# Patient Record
Sex: MALE | Race: WHITE | Employment: PART TIME | URBAN - METROPOLITAN AREA
[De-identification: names, ages, dates, MRNs, and addresses within clinical notes are randomized per-mention and may not be internally consistent; named-entity substitution may affect disease eponyms.]

---

## 2019-02-26 ENCOUNTER — HOSPITAL ENCOUNTER (EMERGENCY)
Age: 59
Discharge: HOME | End: 2019-02-26
Attending: EMERGENCY MEDICINE
Payer: SELF-PAY

## 2019-02-26 VITALS
WEIGHT: 182 LBS | HEIGHT: 72 IN | SYSTOLIC BLOOD PRESSURE: 127 MMHG | OXYGEN SATURATION: 97 % | RESPIRATION RATE: 17 BRPM | DIASTOLIC BLOOD PRESSURE: 91 MMHG | BODY MASS INDEX: 24.65 KG/M2 | TEMPERATURE: 97 F | HEART RATE: 86 BPM

## 2019-02-26 DIAGNOSIS — M25.9 MULTIPLE JOINT COMPLAINTS: ICD-10-CM

## 2019-02-26 DIAGNOSIS — M25.512 PAIN IN JOINT OF LEFT SHOULDER: Primary | ICD-10-CM

## 2019-02-26 PROCEDURE — 74011000250 HC RX REV CODE- 250: Performed by: EMERGENCY MEDICINE

## 2019-02-26 PROCEDURE — 99284 EMERGENCY DEPT VISIT MOD MDM: CPT

## 2019-02-26 PROCEDURE — 74011250637 HC RX REV CODE- 250/637: Performed by: EMERGENCY MEDICINE

## 2019-02-26 RX ORDER — IBUPROFEN 600 MG/1
600 TABLET ORAL
Status: COMPLETED | OUTPATIENT
Start: 2019-02-26 | End: 2019-02-26

## 2019-02-26 RX ORDER — ACETAMINOPHEN 325 MG/1
650 TABLET ORAL
Qty: 20 TAB | Refills: 0 | Status: SHIPPED | OUTPATIENT
Start: 2019-02-26 | End: 2021-12-13

## 2019-02-26 RX ORDER — METHYLPREDNISOLONE 4 MG/1
TABLET ORAL
Qty: 1 DOSE PACK | Refills: 0 | Status: SHIPPED | OUTPATIENT
Start: 2019-02-26 | End: 2021-12-13

## 2019-02-26 RX ORDER — ACETAMINOPHEN 500 MG
1000 TABLET ORAL
Status: COMPLETED | OUTPATIENT
Start: 2019-02-26 | End: 2019-02-26

## 2019-02-26 RX ORDER — IBUPROFEN 600 MG/1
TABLET ORAL
COMMUNITY
End: 2019-02-26

## 2019-02-26 RX ORDER — LIDOCAINE 4 G/100G
1 PATCH TOPICAL EVERY 24 HOURS
Status: DISCONTINUED | OUTPATIENT
Start: 2019-02-26 | End: 2019-02-26 | Stop reason: HOSPADM

## 2019-02-26 RX ORDER — IBUPROFEN 600 MG/1
600 TABLET ORAL
Qty: 20 TAB | Refills: 0 | Status: SHIPPED | OUTPATIENT
Start: 2019-02-26 | End: 2021-12-13

## 2019-02-26 RX ADMIN — IBUPROFEN 600 MG: 600 TABLET, FILM COATED ORAL at 09:07

## 2019-02-26 RX ADMIN — ACETAMINOPHEN 1000 MG: 500 TABLET, FILM COATED ORAL at 09:07

## 2019-02-26 NOTE — LETTER
NOTIFICATION RETURN TO WORK / SCHOOL 
 
2/26/2019 8:56 AM 
 
Mr. Frances Pelayo [address] [place] [address] To Whom It May Concern: 
 
Frances Pelayo is currently under the care of [facility] EMERGENCY DEPT. He will return to work/school on: 2/28/2019 If there are questions or concerns please have the patient contact our office. Sincerely, Jarred Kearney MD

## 2019-02-26 NOTE — DISCHARGE INSTRUCTIONS

## 2019-02-26 NOTE — ED PROVIDER NOTES
EMERGENCY DEPARTMENT HISTORY AND PHYSICAL EXAM 
 
8:33 AM 
 
 
Date: 2/26/2019 Patient Name: Halie Oviedo History of Presenting Illness Chief Complaint Patient presents with  Joint Pain History Provided By: Patient Additional History (Context): Halie Oviedo is a 63 y.o. male with osteoarthritis who presents with acute on chronic joint pain. Pt states this pain has been an issue for 7-8 yrs, and he was last seen by an orthopedic specialist 3 yrs ago due to not currently having insurance. Pt c/o diffuse joint pain to shoulders, knee, feet, with L shoulder being the most painful. He denies fever, N/V, melena or hematochezia, any acute sx. Pt has a PSHx fusions. Pt uses tobacco; he is a , which he states is contributing to his pain. PCP: None Chief Complaint: Arthralgias Duration:  days, chronic for years Timing:  worsening, acute on chronic Location: Diffuse Quality: not reported Severity: 8 out of 10 Modifying Factors: worsened by his occupation Associated Symptoms: denies any other associated signs or symptoms Current Facility-Administered Medications Medication Dose Route Frequency Provider Last Rate Last Dose  acetaminophen (TYLENOL) tablet 1,000 mg  1,000 mg Oral NOW Cailin Maldonado MD      
 ibuprofen (MOTRIN) tablet 600 mg  600 mg Oral NOW Cailin Maldonado MD      
 lidocaine 4 % patch 1 Patch  1 Patch TransDERmal Q24H Cailin Maldonado MD      
 
Current Outpatient Medications Medication Sig Dispense Refill  methylPREDNISolone (MEDROL, CHARLES,) 4 mg tablet Use as prescribed 1 Dose Pack 0  ibuprofen (MOTRIN) 600 mg tablet Take 1 Tab by mouth every six (6) hours as needed for Pain. 20 Tab 0  
 acetaminophen (TYLENOL) 325 mg tablet Take 2 Tabs by mouth every four (4) hours as needed for Pain. 20 Tab 0 Past History Past Medical History: 
Past Medical History:  
Diagnosis Date  Arthritis Past Surgical History: Past Surgical History:  
Procedure Laterality Date  HX UROLOGICAL  2008 Adrenalectomy Family History: 
History reviewed. No pertinent family history. Social History: 
Social History Tobacco Use  Smoking status: Current Every Day Smoker Packs/day: 0.25  Smokeless tobacco: Never Used Substance Use Topics  Alcohol use: Yes  Drug use: No  
 
 
Allergies: 
No Known Allergies Review of Systems Review of Systems Constitutional: Negative for activity change, fatigue and fever. HENT: Negative for congestion and rhinorrhea. Eyes: Negative for visual disturbance. Respiratory: Negative for shortness of breath. Cardiovascular: Negative for chest pain and palpitations. Gastrointestinal: Negative for abdominal pain, diarrhea, nausea and vomiting. Genitourinary: Negative for dysuria and hematuria. Musculoskeletal: Positive for arthralgias. Negative for back pain. Skin: Negative for rash. Neurological: Negative for dizziness, weakness and light-headedness. Psychiatric/Behavioral: Negative for agitation. All other systems reviewed and are negative. Physical Exam  
 
Visit Vitals BP (!) 157/100 (BP 1 Location: Right arm, BP Patient Position: At rest) Pulse 86 Temp 97 °F (36.1 °C) Resp 17 Ht 6' (1.829 m) Wt 82.6 kg (182 lb) SpO2 99% BMI 24.68 kg/m² Physical Exam  
Constitutional: He appears well-developed and well-nourished. HENT:  
Head: Normocephalic and atraumatic. Eyes: Conjunctivae are normal. Pupils are equal, round, and reactive to light. Neck: Normal range of motion. Neck supple. Cardiovascular: Normal rate and regular rhythm. Pulses: 
     Dorsalis pedis pulses are 2+ on the right side, and 2+ on the left side. Pulmonary/Chest: Effort normal and breath sounds normal.  
Abdominal: Soft. Bowel sounds are normal.  
Musculoskeletal: Normal range of motion. L wrist volar lateral small ganglion cyst appreciated L AC joint protuberant Lymphadenopathy:  
  He has no cervical adenopathy. Neurological: He is alert. Skin: Skin is warm. Psychiatric: He has a normal mood and affect. Diagnostic Study Results Labs - No results found for this or any previous visit (from the past 12 hour(s)). Radiologic Studies - No orders to display Medical Decision Making It should be noted that Agueda Walters MD will be the provider of record for this patient. I reviewed the vital signs, available nursing notes, past medical history, past surgical history, family history and social history. Vital Signs-Reviewed the patient's vital signs. Pulse Oximetry Analysis -  100% on room air (Interpretation) wnl 
 
Cardiac Monitor: 
Rate: 86 Records Reviewed: Nursing Notes (Time of Review: 8:33 AM) ED Course: Progress Notes, Reevaluation, and Consults: 
 
 
Provider Notes (Medical Decision Making): Patient presents to emergency department with multiple joint pain including left shoulder and left wrist and right wrists. Patient states all of his joints have been hurting for 8 years have been progressively worse he has never seen a rheumatologist.  He does state that the pain is worse in the morning and worse when he wakes up in the stiffness is there progressively improved. Patient also states that he has no focal neurological deficits no fever chills no vomiting no diarrhea no chest pain no pallor and no bowel or bladder dysfunction. Patient is neurologically intact Patient will also get a follow-up rheumatologist and primary care physician. I will start patient on tapering dose of steroids. Patient is in construction and needs some time off as well. Patient does not any significant physical findings. Diagnosis Clinical Impression: 1. Pain in joint of left shoulder 2. Multiple joint complaints Disposition: Discharge Follow-up Information Follow up With Specialties Details Why Contact [facility] EMERGENCY DEPT Emergency Medicine  If symptoms worsen [address] 
[phone] [facility] Advanced Rheumatology  Call today Follow Up From Emergency Department [address] [address] ([facility]) [address] [phone] [address]   [address] [address]) [address] [phone] Medication List  
  
START taking these medications   
acetaminophen 325 mg tablet Commonly known as:  TYLENOL Take 2 Tabs by mouth every four (4) hours as needed for Pain. methylPREDNISolone 4 mg tablet Commonly known as:  MEDROL (CHARLES) Use as prescribed CHANGE how you take these medications   
ibuprofen 600 mg tablet Commonly known as:  MOTRIN Take 1 Tab by mouth every six (6) hours as needed for Pain. What changed:  how much to take Where to Get Your Medications Information about where to get these medications is not yet available Ask your nurse or doctor about these medications · acetaminophen 325 mg tablet · ibuprofen 600 mg tablet · methylPREDNISolone 4 mg tablet 
  
 
_______________________________ Scribe Attestation [facility] acting as a scribe for and in the presence of Jennifer Ornelas MD     
February 26, 2019 at 8:33 AM 
    
Provider Attestation:     
I personally performed the services described in the documentation, reviewed the documentation, as recorded by the scribe in my presence, and it accurately and completely records my words and actions. February 26, 2019 at 8:33 AM - Jennifer Ornelas MD   
 
 
_______________________________

## 2021-01-27 ENCOUNTER — HOSPITAL ENCOUNTER (OUTPATIENT)
Dept: PREADMISSION TESTING | Age: 61
Discharge: HOME | End: 2021-01-27
Payer: MEDICAID

## 2021-01-27 ENCOUNTER — TRANSCRIBE ORDER (OUTPATIENT)
Dept: REGISTRATION | Age: 61
End: 2021-01-27

## 2021-01-27 DIAGNOSIS — Z20.828 EXPOSURE TO SARS-ASSOCIATED CORONAVIRUS: ICD-10-CM

## 2021-01-27 DIAGNOSIS — Z01.812 BLOOD TESTS PRIOR TO TREATMENT OR PROCEDURE: ICD-10-CM

## 2021-01-27 DIAGNOSIS — Z01.812 BLOOD TESTS PRIOR TO TREATMENT OR PROCEDURE: Primary | ICD-10-CM

## 2021-01-27 PROCEDURE — U0003 INFECTIOUS AGENT DETECTION BY NUCLEIC ACID (DNA OR RNA); SEVERE ACUTE RESPIRATORY SYNDROME CORONAVIRUS 2 (SARS-COV-2) (CORONAVIRUS DISEASE [COVID-19]), AMPLIFIED PROBE TECHNIQUE, MAKING USE OF HIGH THROUGHPUT TECHNOLOGIES AS DESCRIBED BY CMS-2020-01-R: HCPCS

## 2021-01-28 LAB — SARS-COV-2, COV2NT: NOT DETECTED

## 2021-01-29 ENCOUNTER — ANESTHESIA EVENT (OUTPATIENT)
Dept: ENDOSCOPY | Age: 61
End: 2021-01-29
Payer: MEDICAID

## 2021-02-01 ENCOUNTER — ANESTHESIA (OUTPATIENT)
Dept: ENDOSCOPY | Age: 61
End: 2021-02-01
Payer: MEDICAID

## 2021-02-01 ENCOUNTER — HOSPITAL ENCOUNTER (OUTPATIENT)
Age: 61
Setting detail: OUTPATIENT SURGERY
Discharge: HOME | End: 2021-02-01
Attending: INTERNAL MEDICINE | Admitting: INTERNAL MEDICINE
Payer: MEDICAID

## 2021-02-01 VITALS
DIASTOLIC BLOOD PRESSURE: 85 MMHG | SYSTOLIC BLOOD PRESSURE: 118 MMHG | HEART RATE: 68 BPM | OXYGEN SATURATION: 99 % | TEMPERATURE: 97.4 F | RESPIRATION RATE: 18 BRPM | HEIGHT: 72 IN | BODY MASS INDEX: 21.82 KG/M2 | WEIGHT: 161.1 LBS

## 2021-02-01 PROBLEM — R19.5 HEME POSITIVE STOOL: Status: ACTIVE | Noted: 2021-02-01

## 2021-02-01 PROCEDURE — 00811 ANES LWR INTST NDSC NOS: CPT | Performed by: NURSE ANESTHETIST, CERTIFIED REGISTERED

## 2021-02-01 PROCEDURE — 77030004927 HC CATH ELECHEMSTAS BSC -C: Performed by: INTERNAL MEDICINE

## 2021-02-01 PROCEDURE — 2709999900 HC NON-CHARGEABLE SUPPLY: Performed by: INTERNAL MEDICINE

## 2021-02-01 PROCEDURE — 77030037186 HC VLV ENDOSC STRL DEFENDO DISP MVAT -A: Performed by: INTERNAL MEDICINE

## 2021-02-01 PROCEDURE — 74011250636 HC RX REV CODE- 250/636: Performed by: NURSE ANESTHETIST, CERTIFIED REGISTERED

## 2021-02-01 PROCEDURE — 00811 ANES LWR INTST NDSC NOS: CPT | Performed by: ANESTHESIOLOGY

## 2021-02-01 PROCEDURE — 74011000250 HC RX REV CODE- 250: Performed by: NURSE ANESTHETIST, CERTIFIED REGISTERED

## 2021-02-01 PROCEDURE — 76060000031 HC ANESTHESIA FIRST 0.5 HR: Performed by: INTERNAL MEDICINE

## 2021-02-01 PROCEDURE — 76040000019: Performed by: INTERNAL MEDICINE

## 2021-02-01 RX ORDER — SODIUM CHLORIDE 0.9 % (FLUSH) 0.9 %
5-40 SYRINGE (ML) INJECTION AS NEEDED
Status: CANCELLED | OUTPATIENT
Start: 2021-02-01

## 2021-02-01 RX ORDER — SODIUM CHLORIDE 0.9 % (FLUSH) 0.9 %
5-40 SYRINGE (ML) INJECTION EVERY 8 HOURS
Status: CANCELLED | OUTPATIENT
Start: 2021-02-01

## 2021-02-01 RX ORDER — LIDOCAINE HYDROCHLORIDE 20 MG/ML
INJECTION, SOLUTION EPIDURAL; INFILTRATION; INTRACAUDAL; PERINEURAL AS NEEDED
Status: DISCONTINUED | OUTPATIENT
Start: 2021-02-01 | End: 2021-02-01 | Stop reason: HOSPADM

## 2021-02-01 RX ORDER — SODIUM CHLORIDE 9 MG/ML
25 INJECTION, SOLUTION INTRAVENOUS CONTINUOUS
Status: CANCELLED | OUTPATIENT
Start: 2021-02-01 | End: 2021-02-01

## 2021-02-01 RX ORDER — PROPOFOL 10 MG/ML
INJECTION, EMULSION INTRAVENOUS AS NEEDED
Status: DISCONTINUED | OUTPATIENT
Start: 2021-02-01 | End: 2021-02-01 | Stop reason: HOSPADM

## 2021-02-01 RX ORDER — SODIUM CHLORIDE, SODIUM LACTATE, POTASSIUM CHLORIDE, CALCIUM CHLORIDE 600; 310; 30; 20 MG/100ML; MG/100ML; MG/100ML; MG/100ML
50 INJECTION, SOLUTION INTRAVENOUS CONTINUOUS
Status: DISCONTINUED | OUTPATIENT
Start: 2021-02-01 | End: 2021-02-01 | Stop reason: HOSPADM

## 2021-02-01 RX ORDER — LIDOCAINE HYDROCHLORIDE 10 MG/ML
0.1 INJECTION, SOLUTION EPIDURAL; INFILTRATION; INTRACAUDAL; PERINEURAL AS NEEDED
Status: DISCONTINUED | OUTPATIENT
Start: 2021-02-01 | End: 2021-02-01 | Stop reason: HOSPADM

## 2021-02-01 RX ADMIN — PROPOFOL 50 MG: 10 INJECTION, EMULSION INTRAVENOUS at 09:33

## 2021-02-01 RX ADMIN — SODIUM CHLORIDE, SODIUM LACTATE, POTASSIUM CHLORIDE, AND CALCIUM CHLORIDE 50 ML/HR: 600; 310; 30; 20 INJECTION, SOLUTION INTRAVENOUS at 08:40

## 2021-02-01 RX ADMIN — PROPOFOL 120 MG: 10 INJECTION, EMULSION INTRAVENOUS at 09:25

## 2021-02-01 RX ADMIN — PROPOFOL 80 MG: 10 INJECTION, EMULSION INTRAVENOUS at 09:28

## 2021-02-01 RX ADMIN — LIDOCAINE HYDROCHLORIDE 60 MG: 20 INJECTION, SOLUTION INTRAVENOUS at 09:25

## 2021-02-01 RX ADMIN — PROPOFOL 50 MG: 10 INJECTION, EMULSION INTRAVENOUS at 09:36

## 2021-02-01 NOTE — DISCHARGE INSTRUCTIONS
Patient Education        Colonoscopy: What to Expect at [address] [place] [address]  After a colonoscopy, you'll stay at the clinic for 1 to 2 hours until the medicines wear off. Then you can go home. But you'll need to arrange for a ride. Your doctor will tell you when you can eat and do your other usual activities. Your doctor will talk to you about when you'll need your next colonoscopy. Your doctor can help you decide how often you need to be checked. This will depend on the results of your test and your risk for colorectal cancer. After the test, you may be bloated or have gas pains. You may need to pass gas. If a biopsy was done or a polyp was removed, you may have streaks of blood in your stool (feces) for a few days. Problems such as heavy rectal bleeding may not occur until several weeks after the test. This isn't common. But it can happen after polyps are removed. This care sheet gives you a general idea about how long it will take for you to recover. But each person recovers at a different pace. Follow the steps below to get better as quickly as possible. How can you care for yourself at home? Activity    · Rest when you feel tired.     · You can do your normal activities when it feels okay to do so. Diet    · Follow your doctor's directions for eating.     · Unless your doctor has told you not to, drink plenty of fluids. This helps to replace the fluids that were lost during the colon prep.     · Do not drink alcohol. Medicines    · Your doctor will tell you if and when you can restart your medicines. He or she will also give you instructions about taking any new medicines.     · If you take aspirin or some other blood thinner, ask your doctor if and when to start taking it again.  Make sure that you understand exactly what your doctor wants you to do.     · If polyps were removed or a biopsy was done during the test, your doctor may tell you not to take aspirin or other anti-inflammatory medicines for a few days. These include ibuprofen (Advil, Motrin) and naproxen (Aleve). Other instructions    · For your safety, do not drive or operate machinery until the medicine wears off and you can think clearly. Your doctor may tell you not to drive or operate machinery until the day after your test.     · Do not sign legal documents or make major decisions until the medicine wears off and you can think clearly. The anesthesia can make it hard for you to fully understand what you are agreeing to. Follow-up care is a key part of your treatment and safety. Be sure to make and go to all appointments, and call your doctor if you are having problems. It's also a good idea to know your test results and keep a list of the medicines you take. When should you call for help? Call 911 anytime you think you may need emergency care. For example, call if:    · You passed out (lost consciousness).     · You pass maroon or bloody stools.     · You have trouble breathing. Call your doctor now or seek immediate medical care if:    · You have pain that does not get better after you take pain medicine.     · You are sick to your stomach or cannot drink fluids.     · You have new or worse belly pain.     · You have blood in your stools.     · You have a fever.     · You cannot pass stools or gas. Watch closely for changes in your health, and be sure to contact your doctor if you have any problems. Where can you learn more? Go to http://www.gray.com/  Enter E264 in the search box to learn more about \"Colonoscopy: What to Expect at Home. \"  Current as of: April 29, 2020               Content Version: 12.6  © 1152-7686 [employer]. Care instructions adapted under license by [employer] (which disclaims liability or warranty for this information).  If you have questions about a medical condition or this instruction, always ask your healthcare professional. Faizan Leslie disclaims any warranty or liability for your use of this information. Patient Education        Learning About Coronavirus [phone])  What is coronavirus (COVID-19)? COVID-19 is a disease caused by a new type of coronavirus. This illness was first found in December 2019. It has since spread worldwide. Coronaviruses are a large group of viruses. They cause the common cold. They also cause more serious illnesses like Middle East respiratory syndrome (MERS) and severe acute respiratory syndrome (SARS). COVID-19 is caused by a novel coronavirus. That means it's a new type that has not been seen in people before. What are the symptoms? Coronavirus (COVID-19) symptoms may include:  · Fever. · Cough. · Trouble breathing. · Chills or repeated shaking with chills. · Muscle pain. · Headache. · Sore throat. · New loss of taste or smell. · Vomiting. · Diarrhea. In severe cases, COVID-19 can cause pneumonia and make it hard to breathe without help from a machine. It can cause death. How is it diagnosed? COVID-19 is diagnosed with a viral test. This may also be called a PCR test or antigen test. It looks for evidence of the virus in your breathing passages or lungs (respiratory system). The test is most often done on a sample from the nose, throat, or lungs. It's sometimes done on a sample of saliva. One way a sample is collected is by putting a long swab into the back of your nose. How is it treated? Mild cases of COVID-19 can be treated at home. Serious cases need treatment in the hospital. Treatment may include medicines to reduce symptoms, plus breathing support such as oxygen therapy or a ventilator. Some people may be placed on their belly to help their oxygen levels. Treatments that may help people who have COVID-19 include:  Antiviral medicines. These medicines treat viral infections. Remdesivir is an example. Immune-based therapy. These medicines help the immune system fight COVID-19.  One example is bamlanivimab. It's a monoclonal antibody. Blood thinners. These medicines help prevent blood clots. People with severe illness are at risk for blood clots. How can you protect yourself and others? The best way to protect yourself from getting sick is to:  · Avoid areas where there is an outbreak. · Avoid contact with people who may be infected. · Avoid crowds and try to stay at least 6 feet away from other people. · Wash your hands often, especially after you cough or sneeze. Use soap and water, and scrub for at least 20 seconds. If soap and water aren't available, use an alcohol-based hand . · Avoid touching your mouth, nose, and eyes. To help avoid spreading the virus to others:  · Stay home if you are sick or have been exposed to the virus. Don't go to school, work, or public areas. And don't use public transportation, ride-shares, or taxis unless you have no choice. · Wear a cloth face cover if you have to go to public areas. · Cover your mouth with a tissue when you cough or sneeze. Then throw the tissue in the trash and wash your hands right away. · If you're sick:  ? Leave your home only if you need to get medical care. But call the doctor's office first so they know you're coming. And wear a face cover. ? Wear the face cover whenever you're around other people. It can help stop the spread of the virus when you cough or sneeze. ? Limit contact with pets and people in your home. If possible, stay in a separate bedroom and use a separate bathroom. ? Clean and disinfect your home every day. Use household  and disinfectant wipes or sprays. Take special care to clean things that you grab with your hands. These include doorknobs, remote controls, phones, and handles on your refrigerator and microwave. And don't forget countertops, tabletops, bathrooms, and computer keyboards. When should you call for help? Call 911 anytime you think you may need emergency care.  For example, call if you have life-threatening symptoms, such as:    · You have severe trouble breathing. (You can't talk at all.)     · You have constant chest pain or pressure.     · You are severely dizzy or lightheaded.     · You are confused or can't think clearly.     · Your face and lips have a blue color.     · You pass out (lose consciousness) or are very hard to wake up. Call your doctor now or seek immediate medical care if:    · You have moderate trouble breathing. (You can't speak a full sentence.)     · You are coughing up blood (more than about 1 teaspoon).     · You have signs of low blood pressure. These include feeling lightheaded; being too weak to stand; and having cold, pale, clammy skin. Watch closely for changes in your health, and be sure to contact your doctor if:    · Your symptoms get worse.     · You are not getting better as expected. Call before you go to the doctor's office. Follow their instructions. And wear a cloth face cover. Current as of: December 18, 2020               Content Version: 12.7  © 2006-2021 [employer]. Care instructions adapted under license by [employer] (which disclaims liability or warranty for this information). If you have questions about a medical condition or this instruction, always ask your healthcare professional. [place] [address] any warranty or liability for your use of this information.   Patient armband removed and shredded

## 2021-02-01 NOTE — H&P
Date of Surgery Update:  Chai Nava was seen and examined. History and physical has been reviewed. The patient has been examined.  There have been no significant clinical changes since the completion of the originally dated History and Physical.    Signed By: Raymon Aldana MD     February 1, 2021 8:56 AM

## 2021-02-01 NOTE — ANESTHESIA PREPROCEDURE EVALUATION
Relevant Problems   No relevant active problems       Anesthetic History   No history of anesthetic complications            Review of Systems / Medical History  Patient summary reviewed, nursing notes reviewed and pertinent labs reviewed    Pulmonary          Smoker      Comments: 1/4 PPD   Neuro/Psych              Cardiovascular                  Exercise tolerance: >4 METS     GI/Hepatic/Renal  Within defined limits              Endo/Other        Arthritis     Other Findings   Comments: Hx of adrenal gland removal after trauma R side           Physical Exam    Airway  Mallampati: I  TM Distance: 4 - 6 cm  Neck ROM: normal range of motion   Mouth opening: Normal     Cardiovascular    Rhythm: regular  Rate: normal         Dental    Dentition: Edentulous     Pulmonary  Breath sounds clear to auscultation               Abdominal  GI exam deferred       Other Findings            Anesthetic Plan    ASA: 3  Anesthesia type: MAC          Induction: Intravenous  Anesthetic plan and risks discussed with: Patient

## 2021-02-01 NOTE — ANESTHESIA POSTPROCEDURE EVALUATION
POST OPERATIVE ROUNDS  Anesthesia Note      Patient is s/p-Procedure(s):  COLONOSCOPY with 7 [place] bipolar electrohemostasis catheter    Anesthesia-MAC        Mental status-Alert,oriented x 3    No apparent Nausea/vomiting. Oxygenation-adequate. Neurological status-grossly intact. No apparent Anesthetic complications. Darin Solders, CRNA    Procedure(s):  COLONOSCOPY with 7 [place] bipolar electrohemostasis catheter.     MAC    <BSHSIANPOST>    INITIAL Post-op Vital signs:   Vitals Value Taken Time   /73 02/01/21 0943   Temp     Pulse 72 02/01/21 0943   Resp 20 02/01/21 0943   SpO2 98 % 02/01/21 0943

## 2021-02-01 NOTE — ADDENDUM NOTE
Addendum  created 02/01/21 0950 by Varsha Gong MD    [address] recorded in [address] [place] [address], [place] [address] filed

## 2021-02-01 NOTE — PERIOP NOTES
.  Pre-Op Summary    Pt arrived via car with family/friend and is oriented to time, place, person and situation. Patient with steady gait with none assistive devices. Visit Vitals  BP (!) 135/95 (BP 1 Location: Left upper arm, BP Patient Position: Sitting)   Pulse 86   Temp 97.5 °F (36.4 °C)   Resp 18   Ht 6' (1.829 m)   Wt 73.1 kg (161 lb 1.6 oz)   SpO2 98%   BMI 21.85 kg/m²       Peripheral IV located on Right forearm. Patients belongings are located [facility]. Patient's point of contact is   Eulalia Jenkins Girlfriend [phone]   . They will be leaving and coming back. They are able to receive medication information. They will be their ride home.

## 2021-02-01 NOTE — PERIOP NOTES
Phase 2 Recovery Summary    Report received from [facility]:    02/01/21 0806 02/01/21 0943   BP: (!) 135/95 118/85   Pulse: 86 68   Resp: 18 18   Temp: 97.5 °F (36.4 °C) 97.4 °F (36.3 °C)   SpO2: 98% 99%   Weight: 73.1 kg (161 lb 1.6 oz)    Height: 6' (1.829 m)        oriented to time, place, person and situation          Lines and Drains  Peripheral Intravenous Line:  Removed prior to discharge    Wound  Wound Foot Right;Medial (Active)   Number of days: 2089        Patient assisted to chair with minimal assist. Pateint tolerating liquids well. Vivek Caputo Discharge discussed with patient, and family over the phone due to covid restrictions. No questions or concerns at this time. Patient had time to ask any questions. Discharge medications reviewed with patient and appropriate educational materials and side effects teaching were provided. Patient discharged to home with his girlfriend Autumn without incident.        Jacek Peacock, RN

## 2021-02-01 NOTE — PROCEDURES
Colonoscopy Procedure Note    Patient: Vangie Klinefelter MRN: [id number]  SSN: [id number]    YOB: 1960  Age: 62 y.o. Sex: male      Date of Procedure: 2/1/2021      Procedures:  COLONOSCOPY:   HISTORY UPDATE: History and physical has been reviewed. The patient has been examined. There have been no significant clinical changes since the completion of the originally dated History and Physical.   INDICATION:    Occult blood in stool   PROCEDURE PERFORMED:Colonoscopy was complete to cecum. ENDOSCOPIST: Adonay Triana MD   ASSISTANT:  Endoscopy Technician-1: Matt Brandt  Endoscopy RN-1: Ricardo Juarez RN   CLASSIFICATION OF PREOPERATIVE RISK: ASA 2 - Patient with mild systemic disease with no functional limitations   ANESTHESIA:  MAC anesthesia   ENDOSCOPE: [id number]                                                                                                        EXTENT OF EXAM: Cecum    QUALITY OF COLON PREPARATION:  good     DESCRIPTION OF PROCEDURE:  The procedure was discussed with the     patient including but not limited to IV conscious sedation, bleeding, perforation and missed polyps and the consent form was signed and witnessed. A safety timeout was performed. Procedure done with MAC. The patient's vitals were monitored at all times, including heart rate and rhythm, oxygen saturation, and blood pressure. The patient was then placed into the left lateral decubitus position. A rectal exam was performed. The Olympus Adult diagnostic colonscope was then passed under direct visualization with ease to the cecum. .  The cecum was identified by landmarks including Ileocecal valve, appendiceal orifice and crow's foot. The scope was then slowly withdrawn while closely visualizing the mucosa in the rectum a retroflection was performed and distal rectum visualized. The scope was then removed. The patient was transferred to the recovery room in stable condition.       FINDINGS:1. 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 2. Mild diverticulosis in sigmoid colon. 3. Small internal hemorrhoids on retroflexion. EBL: 0 ml   SPECIMENS:None   IMPRESSION: 1. 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 2. Mild diverticulosis in sigmoid colon. 3. Small internal hemorrhoids on retroflexion. RECOMMENDATIONS:               1.    High fibre diet                2.   Repeat colonoscopy in 10 yrs.                    Follow Up:   As scheduled      Kamilah Garcia MD   2/1/2021

## 2021-08-04 PROBLEM — R10.10 UPPER ABDOMINAL PAIN: Chronic | Status: ACTIVE | Noted: 2021-08-04

## (undated) DEVICE — MEDI-VAC NON-CONDUCTIVE SUCTION TUBING: Brand: CARDINAL HEALTH

## (undated) DEVICE — KENDALL 500 SERIES DIAPHORETIC FOAM MONITORING ELECTRODE - TEAR DROP SHAPE ( 30/PK): Brand: KENDALL

## (undated) DEVICE — BIPOLAR ELECTROHEMOSTASIS CATHETER: Brand: GOLD PROBE

## (undated) DEVICE — BASIN EMESIS 500CC ROSE 250/CS 60/PLT: Brand: MEDEGEN MEDICAL PRODUCTS, LLC

## (undated) DEVICE — FLEX ADVANTAGE 1500CC: Brand: FLEX ADVANTAGE

## (undated) DEVICE — SYR 50ML SLIP TIP NSAF LF STRL --

## (undated) DEVICE — TUBING, SUCTION, 3/16" X 6', STRAIGHT: Brand: MEDLINE

## (undated) DEVICE — KIT 11+ 2GOWN BX20

## (undated) DEVICE — Device: Brand: DEFENDO VALVE AND CONNECTOR KIT